# Patient Record
Sex: MALE | Race: WHITE
[De-identification: names, ages, dates, MRNs, and addresses within clinical notes are randomized per-mention and may not be internally consistent; named-entity substitution may affect disease eponyms.]

---

## 2018-08-08 ENCOUNTER — HOSPITAL ENCOUNTER (EMERGENCY)
Dept: HOSPITAL 74 - JERFT | Age: 28
Discharge: HOME | End: 2018-08-08
Payer: COMMERCIAL

## 2018-08-08 VITALS — TEMPERATURE: 99.1 F | DIASTOLIC BLOOD PRESSURE: 92 MMHG | SYSTOLIC BLOOD PRESSURE: 152 MMHG | HEART RATE: 88 BPM

## 2018-08-08 VITALS — BODY MASS INDEX: 36.1 KG/M2

## 2018-08-08 DIAGNOSIS — F41.9: Primary | ICD-10-CM

## 2018-08-08 DIAGNOSIS — F41.0: ICD-10-CM

## 2018-08-08 LAB
ALBUMIN SERPL-MCNC: 4.7 G/DL (ref 3.4–5)
ALP SERPL-CCNC: 104 U/L (ref 45–117)
ALT SERPL-CCNC: 85 U/L (ref 12–78)
ANION GAP SERPL CALC-SCNC: 13 MMOL/L (ref 8–16)
AST SERPL-CCNC: 37 U/L (ref 15–37)
BASOPHILS # BLD: 0.7 % (ref 0–2)
BILIRUB SERPL-MCNC: 1.9 MG/DL (ref 0.2–1)
BUN SERPL-MCNC: 11 MG/DL (ref 7–18)
CALCIUM SERPL-MCNC: 9.6 MG/DL (ref 8.5–10.1)
CHLORIDE SERPL-SCNC: 104 MMOL/L (ref 98–107)
CO2 SERPL-SCNC: 24 MMOL/L (ref 21–32)
CREAT SERPL-MCNC: 0.9 MG/DL (ref 0.7–1.3)
DEPRECATED RDW RBC AUTO: 13.5 % (ref 11.9–15.9)
EOSINOPHIL # BLD: 0.9 % (ref 0–4.5)
GLUCOSE SERPL-MCNC: 95 MG/DL (ref 74–106)
HCT VFR BLD CALC: 53.1 % (ref 35.4–49)
HGB BLD-MCNC: 18.8 GM/DL (ref 11.7–16.9)
LYMPHOCYTES # BLD: 16.2 % (ref 8–40)
MCH RBC QN AUTO: 29.6 PG (ref 25.7–33.7)
MCHC RBC AUTO-ENTMCNC: 35.4 G/DL (ref 32–35.9)
MCV RBC: 83.5 FL (ref 80–96)
MONOCYTES # BLD AUTO: 8.3 % (ref 3.8–10.2)
NEUTROPHILS # BLD: 73.9 % (ref 42.8–82.8)
PLATELET # BLD AUTO: 228 K/MM3 (ref 134–434)
PMV BLD: 9.1 FL (ref 7.5–11.1)
POTASSIUM SERPLBLD-SCNC: 4.2 MMOL/L (ref 3.5–5.1)
PROT SERPL-MCNC: 8.4 G/DL (ref 6.4–8.2)
RBC # BLD AUTO: 6.36 M/MM3 (ref 4–5.6)
SODIUM SERPL-SCNC: 141 MMOL/L (ref 136–145)
WBC # BLD AUTO: 11.2 K/MM3 (ref 4–10)

## 2018-08-08 NOTE — PDOC
History of Present Illness





- General


Chief Complaint: Psychiatric


Stated Complaint: PANIC ATTACK


History Source: Patient


Exam Limitations: No Limitations





- History of Present Illness


Initial Comments: 





The patient him to emergency department for evaluation of increasing panic and 

anxiety. Patient states he suffers from anxiety and her ventilatory episodes 

one time a month once every other month since 2011 but states due to 

situational stressors including job change states this panic attack started on 

Sunday and has it intermittently progressed to where he needs attention. States 

generally is able to calm himself by resting, taking cold showers,  which she 

has tried through these episodes but feels anxiety is escalating. Has never had 

professional help or counseling psychiatric issues. Patient denies drug or 

alcohol use for the past 6 years. However states drinks red bull for the 

caffeine and "help keep me awake". Throat pain, denies cough or any respiratory 

illness. Denies nausea vomiting diarrhea or constipation although states feels 

anorexic. 


Denies current homicidal or suicidal ideation although admits to having 

depressed symptoms. Has family history of local suicide at age 35 and other 

family set suffer from anxiety.


Timing/Duration: just prior to arrival, changing over time, getting worse


Severity: moderate, severe


Associated Symptoms: anxiety, impaired concentration, insomnia





Past History





- Past Medical History


Allergies/Adverse Reactions: 


Allergies





No Known Allergies Allergy (Verified 08/08/18 12:03)


 








Home Medications: 


Ambulatory Orders





LORazepam [Ativan] 0.5 mg PO Q6H PRN #4 tablet MDD 4 08/08/18 








Psychosocial History: Yes: anxiety, panic attacks


Surgical History: Yes: No Surgical History





- Immunization History


Immunization Up to Date: Yes





- Social History


Smoking Status: Never smoked


Alcohol Use: none


Drug Use: none


Patient Lives Alone: No


Lives With: parents





*Review of Systems





- Review of Systems


Able to Perform ROS?: No


Constitutional: Yes: Symptoms Reported, See HPI, Malaise


HEENTM: Yes: See HPI.  No: Symptoms Reported


Respiratory: Yes: See HPI, Cough.  No: Wheezing


Musculoskeletal: Yes: Symptoms Reported, See HPI, Muscle Pain


Neurological: Yes: Symptoms reported, See HPI, Headache, Tingling


Psychiatric: Yes: Anxiety, Stressors


All Other Systems: Reviewed and Negative





*Physical Exam





- Vital Signs


 Last Vital Signs











Temp Pulse Resp BP Pulse Ox


 


 99.1 F   88   18   152/92   98 


 


 08/08/18 12:03  08/08/18 12:03  08/08/18 12:03  08/08/18 12:03  08/08/18 12:03














- Physical Exam


General Appearance: Yes: Nourished, Appropriately Dressed, Apparent Distress, 

Moderate Distress


HEENT: positive: SREEKANTH, Normal ENT Inspection, Normal Voice, TMs Normal, Pharynx 

Normal


Neck: positive: Supple, Lymphadenopathy (R), Lymphadenopathy (L).  negative: 

Tender


Respiratory/Chest: positive: Lungs Clear, Normal Breath Sounds.  negative: 

Wheezing


Gastrointestinal/Abdominal: positive: Normal Bowel Sounds, Soft.  negative: 

Tender


Extremity: positive: Normal Capillary Refill


Integumentary: positive: Dry, Warm, Pale, Clammy


Neurologic: positive: CNs II-XII NML intact, Fully Oriented, Alert, Normal Mood/

Affect, Normal Response, Motor Strength 5/5





Plan





- Progress Note


Progress Note: 





08/08/18 13:09


Lengthy discussion to the patient regarding the need for urgent or emergent 

psychiatric care. Patient feels safe at Hospital and does not feel he will do 

himself or anyone else harm. Reviewed Will check some basic labs including 

thyroid function, chemistries, and CBCs to rule out any intrinsic pathology and 

provide Benadryl for mild sedated purpose and reevaluate





- Order(s)


Order(s): 


 Orders











 Medication  Instructions  Recorded


 


NK [No Known Home Medication]  08/08/18














- Laboratory


CBC & Chemistry Diagram: 


 08/08/18 13:05





 08/08/18 13:05





- Critical Care Time


Comments: 





08/08/18 laboratory work results relatively normal. Reviewed with patient who 

agrees after Benadryl administration and rest feels much improved. Given the 

telephone number for Dr. William Montejo for psychiatric follow-up. Encouraged to 

follow up with private physician for any other medical evaluation as needed. 

Encouraged patient to return to emergency department for recurrence of any 

symptoms including severe anxiety, any ideation of homicidal or suicidal 

thoughts, any significant or severe psychiatric issue.








*DC/Admit/Observation/Transfer


Diagnosis at time of Disposition: 


 Panic attack








- Discharge Dispostion


Disposition: HOME


Condition at time of disposition: Stable


Decision to Admit order: No





- Prescriptions


Prescriptions: 


LORazepam [Ativan] 0.5 mg PO Q6H PRN #4 tablet MDD 4


 PRN Reason: Anxiety





- Referrals


Referrals: 


Samantha Fontana MD [Staff Physician] - 





- Patient Instructions


Printed Discharge Instructions:  DI for Panic Disorder


Additional Instructions: 


Avoid strenuous activity or environments


Drink plenty of Fluids; water, teas, soups avoiding caffeinated beverages or 

stimulant beverages 


See private physician in the next few days for reevaluation


Seek and make appointment with psychiatry as soon as possible for further 

evaluation of anxiety/panic attacks





May use Benadryl 25 mg tablet every 8 hours as needed for mild anxiety


May use Ativan 0.5mg tab every 6 hours for severe anxiety- 





Return to emergency department immediately/call 911 for worsening symptoms, 

panic, homicidal or suicidal ideation.





- Post Discharge Activity


Forms/Work/School Notes:  Back to Work

## 2021-08-21 ENCOUNTER — HOSPITAL ENCOUNTER (EMERGENCY)
Dept: HOSPITAL 74 - JERFT | Age: 31
Discharge: HOME | End: 2021-08-21
Payer: COMMERCIAL

## 2021-08-21 VITALS — HEART RATE: 72 BPM | TEMPERATURE: 98.2 F | SYSTOLIC BLOOD PRESSURE: 146 MMHG | DIASTOLIC BLOOD PRESSURE: 77 MMHG

## 2021-08-21 VITALS — BODY MASS INDEX: 36.9 KG/M2

## 2021-08-21 DIAGNOSIS — M54.5: Primary | ICD-10-CM

## 2021-08-21 PROCEDURE — 3E023GC INTRODUCTION OF OTHER THERAPEUTIC SUBSTANCE INTO MUSCLE, PERCUTANEOUS APPROACH: ICD-10-PCS
